# Patient Record
Sex: MALE | Race: WHITE | NOT HISPANIC OR LATINO | URBAN - METROPOLITAN AREA
[De-identification: names, ages, dates, MRNs, and addresses within clinical notes are randomized per-mention and may not be internally consistent; named-entity substitution may affect disease eponyms.]

---

## 2022-04-30 ENCOUNTER — TELEPHONE ENCOUNTER (OUTPATIENT)
Dept: URBAN - METROPOLITAN AREA CLINIC 121 | Facility: CLINIC | Age: 67
End: 2022-04-30

## 2022-05-01 ENCOUNTER — TELEPHONE ENCOUNTER (OUTPATIENT)
Dept: URBAN - METROPOLITAN AREA CLINIC 121 | Facility: CLINIC | Age: 67
End: 2022-05-01

## 2022-05-01 RX ORDER — POLYETHYLENE GLYCOL 3350 17 G/17G
MIX AND USE AS DIRECTED POWDER, FOR SOLUTION ORAL
Status: ACTIVE | COMMUNITY
Start: 2015-11-16

## 2022-05-01 RX ORDER — FLUOXETINE HCL 10 MG
CAPSULE ORAL
Status: ACTIVE | COMMUNITY
Start: 2015-11-16

## 2022-05-01 RX ORDER — METFORMIN HCL 500 MG/1
TABLET ORAL
Status: ACTIVE | COMMUNITY
Start: 2015-11-16

## 2024-02-08 ENCOUNTER — OV NP (OUTPATIENT)
Dept: URBAN - METROPOLITAN AREA CLINIC 98 | Facility: CLINIC | Age: 69
End: 2024-02-08
Payer: MEDICARE

## 2024-02-08 ENCOUNTER — LAB (OUTPATIENT)
Dept: URBAN - METROPOLITAN AREA CLINIC 98 | Facility: CLINIC | Age: 69
End: 2024-02-08

## 2024-02-08 VITALS
HEART RATE: 86 BPM | TEMPERATURE: 97.3 F | HEIGHT: 69 IN | BODY MASS INDEX: 26.75 KG/M2 | DIASTOLIC BLOOD PRESSURE: 81 MMHG | WEIGHT: 180.6 LBS | SYSTOLIC BLOOD PRESSURE: 159 MMHG

## 2024-02-08 DIAGNOSIS — Z86.010 HISTORY OF COLON POLYPS: ICD-10-CM

## 2024-02-08 DIAGNOSIS — D69.6 THROMBOCYTOPENIA: ICD-10-CM

## 2024-02-08 DIAGNOSIS — K76.0 FATTY LIVER: ICD-10-CM

## 2024-02-08 DIAGNOSIS — R19.4 CHANGE IN BOWEL HABITS: ICD-10-CM

## 2024-02-08 PROBLEM — 197321007: Status: ACTIVE | Noted: 2024-02-08

## 2024-02-08 PROBLEM — 428283002: Status: ACTIVE | Noted: 2024-02-08

## 2024-02-08 PROBLEM — 302215000: Status: ACTIVE | Noted: 2024-02-08

## 2024-02-08 PROCEDURE — 99204 OFFICE O/P NEW MOD 45 MIN: CPT

## 2024-02-08 RX ORDER — POLYETHYLENE GLYCOL 3350 17 G/17G
MIX AND USE AS DIRECTED POWDER, FOR SOLUTION ORAL
Status: ON HOLD | COMMUNITY
Start: 2015-11-16

## 2024-02-08 RX ORDER — METFORMIN HCL 500 MG/1
TABLET ORAL
Status: ON HOLD | COMMUNITY
Start: 2015-11-16

## 2024-02-08 RX ORDER — FLUOXETINE HCL 10 MG
CAPSULE ORAL
Status: ON HOLD | COMMUNITY
Start: 2015-11-16

## 2024-02-08 RX ORDER — SODIUM, POTASSIUM,MAG SULFATES 17.5-3.13G
354ML SOLUTION, RECONSTITUTED, ORAL ORAL
Qty: 354 ML | Refills: 0 | OUTPATIENT
Start: 2024-02-08 | End: 2024-02-09

## 2024-02-08 NOTE — HPI-TODAY'S VISIT:
Mr. Simon is a 68 year old male new patient, referred to Dr. Olsen PCP Dr. Viktoria Rubio; progress note will be sent following visit. Today 2/8/2024 - Here today to discuss fatty liver found on ultrasound - ETOH about 15 drinks per week - Abdominal ultrasound 1/19/24 > Diffuse fatty infiltration of an enlarged liver - Previous labs showed thrombocytopenia with normal LFT 3 months ago - No confusion, jaundice, swelling, fatigue, abdominal pain - Eats a healthy diet with vegtables and protein - Exercise not a lot at this time - Has noticed a change in bowel habits over the last 6 months - BM 1 time per day - Having to strain more to have a BM - Stools are formed - Notices bright red blood from fissure occasionally  - No melena or mucus in stool - Last colonoscopy 2022; pt. states due every 2 years

## 2024-02-13 LAB
A/G RATIO: 2
ACTIN (SMOOTH MUSCLE) ANTIBODY: 6
AFP, SERUM, TUMOR MARKER: 4.2
ALBUMIN: 4.9
ALKALINE PHOSPHATASE: 46
ALT (SGPT): 28
ANA DIRECT: NEGATIVE
AST (SGOT): 30
BASO (ABSOLUTE): 0.1
BASOS: 1
BILIRUBIN, TOTAL: 0.5
BUN/CREATININE RATIO: 19
BUN: 20
CALCIUM: 10.1
CARBON DIOXIDE, TOTAL: 24
CHLORIDE: 98
CREATININE: 1.08
EGFR: 75
EOS (ABSOLUTE): 0.1
EOS: 2
FERRITIN, SERUM: 391
GLOBULIN, TOTAL: 2.5
GLUCOSE: 82
HBSAG SCREEN: NEGATIVE
HEMATOCRIT: 42.3
HEMATOLOGY COMMENTS:: (no result)
HEMOGLOBIN: 14.5
HEP A AB, IGM: NEGATIVE
HEP A AB, TOTAL: POSITIVE
HEP C VIRUS AB: NON REACTIVE
HEPATITIS B SURF AB QUANT: 809.7
IMMATURE CELLS: (no result)
IMMATURE GRANS (ABS): 0
IMMATURE GRANULOCYTES: 1
IMMUNOGLOBULIN A, QN, SERUM: 247
IMMUNOGLOBULIN E, TOTAL: 15
IMMUNOGLOBULIN G, QN, SERUM: 864
IMMUNOGLOBULIN M, QN, SERUM: 41
IRON BIND.CAP.(TIBC): 356
IRON SATURATION: 40
IRON: 143
LYMPHS (ABSOLUTE): 2.3
LYMPHS: 39
MCH: 30.9
MCHC: 34.3
MCV: 90
MITOCHONDRIAL (M2) ANTIBODY: <20
MONOCYTES(ABSOLUTE): 0.4
MONOCYTES: 7
NEUTROPHILS (ABSOLUTE): 2.9
NEUTROPHILS: 50
NRBC: (no result)
PLATELETS: 149
POTASSIUM: 4.2
PROTEIN, TOTAL: 7.4
RBC: 4.7
RDW: 12.1
SODIUM: 137
UIBC: 213
WBC: 5.9

## 2024-03-01 ENCOUNTER — COLON (OUTPATIENT)
Dept: URBAN - METROPOLITAN AREA SURGERY CENTER 18 | Facility: SURGERY CENTER | Age: 69
End: 2024-03-01
Payer: MEDICARE

## 2024-03-01 ENCOUNTER — LAB (OUTPATIENT)
Dept: URBAN - METROPOLITAN AREA CLINIC 4 | Facility: CLINIC | Age: 69
End: 2024-03-01
Payer: MEDICARE

## 2024-03-01 DIAGNOSIS — D12.2 BENIGN NEOPLASM OF ASCENDING COLON: ICD-10-CM

## 2024-03-01 DIAGNOSIS — D12.2 ADENOMA OF ASCENDING COLON: ICD-10-CM

## 2024-03-01 DIAGNOSIS — Z86.010 ADENOMAS PERSONAL HISTORY OF COLONIC POLYPS: ICD-10-CM

## 2024-03-01 PROCEDURE — 45385 COLONOSCOPY W/LESION REMOVAL: CPT | Performed by: INTERNAL MEDICINE

## 2024-03-01 PROCEDURE — G8907 PT DOC NO EVENTS ON DISCHARG: HCPCS | Performed by: INTERNAL MEDICINE

## 2024-03-01 PROCEDURE — 88305 TISSUE EXAM BY PATHOLOGIST: CPT | Performed by: PATHOLOGY

## 2024-03-01 RX ORDER — POLYETHYLENE GLYCOL 3350 17 G/17G
MIX AND USE AS DIRECTED POWDER, FOR SOLUTION ORAL
Status: ON HOLD | COMMUNITY
Start: 2015-11-16

## 2024-03-01 RX ORDER — METFORMIN HCL 500 MG/1
TABLET ORAL
Status: ON HOLD | COMMUNITY
Start: 2015-11-16

## 2024-03-01 RX ORDER — FLUOXETINE HCL 10 MG
CAPSULE ORAL
Status: ON HOLD | COMMUNITY
Start: 2015-11-16

## 2024-03-21 ENCOUNTER — OV EP (OUTPATIENT)
Dept: URBAN - METROPOLITAN AREA CLINIC 98 | Facility: CLINIC | Age: 69
End: 2024-03-21
Payer: MEDICARE

## 2024-03-21 VITALS
BODY MASS INDEX: 26.1 KG/M2 | DIASTOLIC BLOOD PRESSURE: 75 MMHG | HEIGHT: 68 IN | SYSTOLIC BLOOD PRESSURE: 127 MMHG | HEART RATE: 91 BPM | RESPIRATION RATE: 18 BRPM | TEMPERATURE: 97.2 F | WEIGHT: 172.2 LBS

## 2024-03-21 DIAGNOSIS — D69.6 THROMBOCYTOPENIA: ICD-10-CM

## 2024-03-21 DIAGNOSIS — Z86.010 HISTORY OF COLON POLYPS: ICD-10-CM

## 2024-03-21 DIAGNOSIS — K76.0 FATTY LIVER: ICD-10-CM

## 2024-03-21 PROCEDURE — 99213 OFFICE O/P EST LOW 20 MIN: CPT

## 2024-03-21 RX ORDER — POLYETHYLENE GLYCOL 3350 17 G/17G
MIX AND USE AS DIRECTED POWDER, FOR SOLUTION ORAL
Status: ON HOLD | COMMUNITY
Start: 2015-11-16

## 2024-03-21 RX ORDER — FLUOXETINE HCL 10 MG
CAPSULE ORAL
Status: ON HOLD | COMMUNITY
Start: 2015-11-16

## 2024-03-21 RX ORDER — METFORMIN HCL 500 MG/1
TABLET ORAL
Status: ON HOLD | COMMUNITY
Start: 2015-11-16

## 2024-03-21 NOTE — HPI-TODAY'S VISIT:
Mr. Simon is a 68 year old male new patient, referred to Dr. Olsen PCP Dr. Viktoria Rubio; progress note will be sent following visit. Today 2/8/2024 - Here today to discuss fatty liver found on ultrasound - ETOH about 15 drinks per week - Abdominal ultrasound 1/19/24 > Diffuse fatty infiltration of an enlarged liver - Previous labs showed thrombocytopenia with normal LFT 3 months ago - No confusion, jaundice, swelling, fatigue, abdominal pain - Eats a healthy diet with vegtables and protein - Exercise not a lot at this time - Has noticed a change in bowel habits over the last 6 months - BM 1 time per day - Having to strain more to have a BM - Stools are formed - Notices bright red blood from fissure occasionally  - No melena or mucus in stool - Last colonoscopy 2022; pt. states due every 2 years Visit 3/21/24 - Here to follow-up Fibroscan results; fatty liver - Has decreased etoh 6-8 drinks per week - Eating better - Doing exercise - FibroScan - F0- moderate steatosis

## 2024-03-21 NOTE — HPI-OTHER HISTORIES
FIBROSCAN TEST RESULTS - [facility] Referring MD: Dr. Kathy Triplett NP, [facility] Interpreting MD: Horace Lee MD   Interpretation: - FibroScan result estimated to be stage F0 hepatic fibrosis* - moderate hepatic steatosis is present based on Controlled Attenuation Parameter (CAP) reading  Plan: - Follow up with the ordering physician as planned for plans - Results e-faxed to the referring/ordering physician(s)  ------------------------------------------------------------------------------------------------------------------- Indication: 68 y.o. male with chronic liver disease due to chronic non-alcoholic fatty liver disease for the noninvasive assessment of liver fibrosis utilizing FibroScan transient elastography.    Exam Details: The patient was brought to the procedure room after cleaning/disinfection of the FibroScan Vibration Controlled Transient Elastography (VCTE) equipment per protocol. The patient fasted for 3 hours prior to the procedure. The test was explained fully to the patient and there was an opportunity to ask questions. The patient elected to proceed. Patient identification was verified and entered into the Fibroscan software. The patient was placed in the supine position with the right arm in maximum abduction to allow optimal exposure to the right lateral rib cage area. Fibroscan was performed per protocol using the M or XL probe (see scanned report).  A series of at least ten successful 50 mHz mechanical shear wave pulses were performed. The patient tolerated the test well and was discharged without incident. The printed Fibroscan results were interpreted by me and signed and scanned into the electronic medical record.   Results: - median liver stiffness of 5 kPa consistent with F0 stage hepatic fibrosis out of F4 (F4 is consistent with liver cirrhosis)* - IQR/med was 6% (within expected range of 60% success rate - Controlled Attenuation Parameter (CAP) was 240 dB/m which is consistent with moderate hepatic steatosis -  dB/m = severe hepatic steatosis - see Fibroscan result scanned into EMR (media tab) for full detailed results   Horace Lee MD Transplant Hepatologist [facility].

## 2025-03-20 ENCOUNTER — OFFICE VISIT (OUTPATIENT)
Dept: URBAN - METROPOLITAN AREA CLINIC 98 | Facility: CLINIC | Age: 70
End: 2025-03-20

## 2025-03-24 ENCOUNTER — DASHBOARD ENCOUNTERS (OUTPATIENT)
Age: 70
End: 2025-03-24

## 2025-03-24 ENCOUNTER — OFFICE VISIT (OUTPATIENT)
Dept: URBAN - METROPOLITAN AREA CLINIC 98 | Facility: CLINIC | Age: 70
End: 2025-03-24
Payer: MEDICARE

## 2025-03-24 DIAGNOSIS — Z86.0101 PERSONAL HISTORY OF ADENOMATOUS AND SERRATED COLON POLYPS: ICD-10-CM

## 2025-03-24 DIAGNOSIS — D69.6 THROMBOCYTOPENIA: ICD-10-CM

## 2025-03-24 DIAGNOSIS — K76.0 FATTY LIVER: ICD-10-CM

## 2025-03-24 PROCEDURE — 99213 OFFICE O/P EST LOW 20 MIN: CPT

## 2025-03-24 RX ORDER — FLUOXETINE HCL 10 MG
CAPSULE ORAL
Status: ON HOLD | COMMUNITY
Start: 2015-11-16

## 2025-03-24 RX ORDER — METFORMIN HCL 500 MG/1
TABLET ORAL
Status: ON HOLD | COMMUNITY
Start: 2015-11-16

## 2025-03-24 RX ORDER — POLYETHYLENE GLYCOL 3350 17 G/17G
MIX AND USE AS DIRECTED POWDER, FOR SOLUTION ORAL
Status: ON HOLD | COMMUNITY
Start: 2015-11-16

## 2025-03-24 NOTE — HPI-TODAY'S VISIT:
3/24/25- Rita Bosch, NP - 68 yo male here to follow-up annual fatty liver - PCP Dr. Namrata Ortiz - Exercising 3 times per week- weights or walking 30 minutes per day - Cholesterol elevated slightly last visit - Eating healthy - Was drinking 6-8 alcohol drinks per week for past several months - Supplements: multi vitamin, apricot tablets, blackseed oil

## 2025-03-24 NOTE — PHYSICAL EXAM GASTROINTESTINAL
Abdomen , soft, nontender, nondistended , no guarding or rigidity , no masses palpable , normal bowel sounds , Liver and Spleen,  no hepatosplenomegaly , liver nontender water 10am/clears

## 2025-03-24 NOTE — HPI-OTHER HISTORIES
Today 2/8/2024 Mr. Simon is a 68 year old male new patient, referred to Dr. Olsen PCP Dr. Viktoria Rubio; progress note will be sent following visit. - Here today to discuss fatty liver found on ultrasound - ETOH about 15 drinks per week - Abdominal ultrasound 1/19/24 > Diffuse fatty infiltration of an enlarged liver - Previous labs showed thrombocytopenia with normal LFT 3 months ago - No confusion, jaundice, swelling, fatigue, abdominal pain - Eats a healthy diet with vegtables and protein - Exercise not a lot at this time - Has noticed a change in bowel habits over the last 6 months - BM 1 time per day - Having to strain more to have a BM - Stools are formed - Notices bright red blood from fissure occasionally  - No melena or mucus in stool - Last colonoscopy 2022; pt. states due every 2 years Visit 3/21/24 - Here to follow-up Fibroscan results; fatty liver - Has decreased etoh 6-8 drinks per week - Eating better - Doing exercise - 2024- FibroScan: estimated to be stage F0 hepatic fibrosis* - moderate hepatic steatosis is present based on CAP score 240 dB/m

## 2025-03-28 LAB
ALBUMIN: 4.8
ALKALINE PHOSPHATASE: 55
ALT (SGPT): 25
AST (SGOT): 35
BASO (ABSOLUTE): 0
BASOS: 1
BILIRUBIN, TOTAL: 0.6
BUN/CREATININE RATIO: 13
BUN: 14
CALCIUM: 10
CARBON DIOXIDE, TOTAL: 26
CHLORIDE: 100
CREATININE: 1.12
EGFR: 72
ELF(TM) SCORE: 10.09
EOS (ABSOLUTE): 0.1
EOS: 2
GLOBULIN, TOTAL: 2.3
GLUCOSE: 104
HEMATOCRIT: 43.7
HEMATOLOGY COMMENTS:: (no result)
HEMOGLOBIN: 14.8
IMMATURE CELLS: (no result)
IMMATURE GRANS (ABS): 0
IMMATURE GRANULOCYTES: 1
LYMPHS (ABSOLUTE): 1.7
LYMPHS: 32
MCH: 30.8
MCHC: 33.9
MCV: 91
MONOCYTES(ABSOLUTE): 0.4
MONOCYTES: 7
NEUTROPHILS (ABSOLUTE): 3
NEUTROPHILS: 57
NRBC: (no result)
PLATELETS: 131
POTASSIUM: 4.8
PROTEIN, TOTAL: 7.1
RBC: 4.81
RDW: 11.8
SODIUM: 139
WBC: 5.2